# Patient Record
Sex: FEMALE | Race: WHITE | URBAN - METROPOLITAN AREA
[De-identification: names, ages, dates, MRNs, and addresses within clinical notes are randomized per-mention and may not be internally consistent; named-entity substitution may affect disease eponyms.]

---

## 2020-12-22 ENCOUNTER — HOSPITAL ENCOUNTER (OUTPATIENT)
Dept: PHYSICAL THERAPY | Age: 65
Setting detail: THERAPIES SERIES
Discharge: HOME | End: 2020-12-22
Payer: COMMERCIAL

## 2020-12-22 PROCEDURE — 97110 THERAPEUTIC EXERCISES: CPT | Performed by: PHYSICAL THERAPIST

## 2020-12-22 PROCEDURE — 97140 MANUAL THERAPY 1/> REGIONS: CPT | Performed by: PHYSICAL THERAPIST

## 2020-12-22 PROCEDURE — 97161 PT EVAL LOW COMPLEX 20 MIN: CPT | Performed by: PHYSICAL THERAPIST

## 2020-12-22 NOTE — PLAN OF CARE
[address] [facility],[address], [address] [place], [address]  Phone: [phone]   Fax: [phone]              Physical Therapy Certification    Dear Referring Practitioner: Allen,    We had the pleasure of evaluating the following patient for physical therapy services at     [address] [place] [address]. A summary of our findings can be found in the initial assessment below. This includes our plan of care. If you have any questions or concerns regarding these findings, please do not hesitate to contact me at the office phone number checked above. Thank you for the referral.       Physician Signature:_______________________________Date:__________________  By signing above (or electronic signature), therapists plan is approved by physician      Patient: Booker Higuera \"Meka\"   : 1955   MRN: [id number]  Referring Physician: Referring Practitioner: Allen      Evaluation Date: 2020      Medical Diagnosis Information:  Diagnosis: M51.36 (ICD-10-CM) - Other intervertebral disc degeneration, lumbar region Thigh pain-M79.659  Treatment Diagnosis: M51.36 (ICD-10-CM) - Other intervertebral disc degeneration, lumbar region; M54.40- low back pain with radiculopathy          Precautions/ Contra-indications: anxiety, numbness in left leg from buttock to knee in anterior thigh. C-SSRS Triggered by Intake questionnaire (Past 2 wk assessment):   [x] No, Questionnaire did not trigger screening.   [] Yes, Patient intake triggered further evaluation      [] C-SSRS Screening completed  [] PCP notified via Plan of Care  [] Emergency services notified   Latex Allergy:  [x] NO      [] YES  Preferred Language for Healthcare:   [x] English       [] other:    SUBJECTIVE: Patient stated complaint: This problem has escalated around the middle of Nov.  For the past 5 years, she had pain on lateral leg. She did stretch. This did help somewhat.   It would linger. She thought she had ITB and trochanteric bursitis. Then in the middle of November, if she lays on her back or her left side, she has numbness. She has to lay on her right with her left hip flexed. She does not have increased symptoms with standing or walking. Sitting and laying down is worse. When she sits she feels like she has to sit on her right side. She has seen her PCP. She saw her PCP when she had trouble sleeping. She ordered a MRI, which was done on 12/8/2020. Her PCP gave her the reading of the MRI. She then got her referral for PT. Relevant Medical History:anxiety, numbness in left leg from buttock to knee in anterior thigh. Cataracts surgery, macular pucker surgery in 2014, and foot surgery in 2016, h/o bulging disc in neck  Functional Scale:    CHRIS-14%, WOMAC-6.25%    Pain Scale: 0.5/10  Easing factors: Pain at best:  0/10. Treatment: walking, stretches, meloxicam- doesn't completely get rid of the pain, heat, ice. Heat feels better. Provocative factors: Pain at worst:  8/10. If she wakes up on her back, it really hurts. Type: [] Constant   [x] Intermittent  [] Radiating [] Localized [] other: tingling and numbness. Feels like if your foot goes to sleep     Numbness/Tingling: left hip to anterior thigh stopping above the knee    Functional Limitations/Impairments: [x] Sitting [] Standing [] Walking    [] Squatting/bending  [] Stairs           [] ADL's  [] Transfers [] Sports/Recreation [] Other:    Occupation/School: retired-     Living Status/Prior Level of Function: Independent with ADLs and IADLs, working out 3x/wk going to gym. Working out from home doing an Meritful. She was doing some twisting, and she didn't want to make it worse. She then stopped as she was nervous about hurting her back.   She is walking every day for about 1 hr.          Standing Exam Normal Abnormal N/A Comments   Toe walk   x      Heel Walk x buttock, and/or LE    [] peripheralization with extension movements    [] + SLR or + crossed SLR (symptoms less than 70 degrees)  [] Movement Control Approach   [] \"Stabilization subgroup\"    [] younger age (less than 36)     [] greater general flexibility (post-partum, SLR >90)    [] abberant movements, painful arc, or Nevin's sign with flex/ext ROM    [] positive prone instability test  [] Functional Optimization Approach   [] \"unspecified subgroup\"    [] lower disability scores     [] lower fear avoidance scores     [] longer duration of symptoms (chronic)    [] prior episodes of low back pain    [] older age                                  [x] Patient history, allergies, meds reviewed. Medical chart reviewed. See intake form. Review Of Systems (ROS):  [x]Performed Review of systems (Integumentary, CardioPulmonary, Neurological) by intake and observation. Intake form has been scanned into medical record. Patient has been instructed to contact their primary care physician regarding ROS issues if not already being addressed at this time.       Co-morbidities/Complexities (which will affect course of rehabilitation):   []None           Arthritic conditions   []Rheumatoid arthritis (M05.9)  []Osteoarthritis (M19.91)   Cardiovascular conditions   []Hypertension (I10)  []Hyperlipidemia (E78.5)  []Angina pectoris (I20)  []Atherosclerosis (I70)   Musculoskeletal conditions   []Disc pathology   []Congenital spine pathologies   []Prior surgical intervention  []Osteoporosis (M81.8)  []Osteopenia (M85.8)   Endocrine conditions   []Hypothyroid (E03.9)  []Hyperthyroid Gastrointestinal conditions   []Constipation (D82.34)   Metabolic conditions   []Morbid obesity (E66.01)  []Diabetes type 1(E10.65) or 2 (E11.65)   []Neuropathy (G60.9)     Pulmonary conditions   []Asthma (J45)  []Coughing   []COPD (J44.9)   Psychological Disorders  [x]Anxiety (F41.9)  []Depression (F32.9)   []Other:   []Other:          Barriers to/and or personal factors that will affect rehab potential:              []Age  []Sex              []Motivation/Lack of Motivation                        []Co-Morbidities              []Cognitive Function, education/learning barriers              []Environmental, home barriers              []profession/work barriers  []past PT/medical experience  []other:  Justification: Pt has had lateral leg pain for years. However, it has gotten worse and now turned into more numbness and tingling. She is now having difficulty sleeping. Her MRI states she has L5-S1 disc desiccation, concentric bulging disc without focal disc herniation or central spinal stenosis. Facet arthropathy. She reports that she thinks traction would be helpful, and it did feel better for her. Pt's insurance will start over with a large deductible. We did review this. She does have some tight ITB on her left with tight piriformis and glut med weakness as well as her radiating symptoms. Falls Risk Assessment (30 days):   [x] Falls Risk assessed and no intervention required.   [] Falls Risk assessed and Patient requires intervention due to being higher risk   TUG score (>12s at risk):     [] Falls education provided, including       ASSESSMENT:   Functional Impairments:     []Noted lumbar/proximal hip hypomobility   []Noted lumbosacral and/or generalized hypermobility   []Decreased Lumbosacral/hip/LE functional ROM   [x]Decreased core/proximal hip strength and neuromuscular control    [x]Decreased LE functional strength    [x]Abnormal reflexes/sensation/myotomal/dermatomal deficits  []Reduced balance/proprioceptive control    []other:      Functional Activity Limitations (from functional questionnaire and intake)   [x]Reduced ability to tolerate prolonged functional positions   []Reduced ability or difficulty with changes of positions or transfers between positions   []Reduced ability to maintain good posture and demonstrate good body mechanics with sitting, bending, and lifting   [x]Reduced ability to sleep   [] Reduced ability or tolerance with driving and/or computer work   []Reduced ability to perform lifting, reaching, carrying tasks   []Reduced ability to squat   []Reduced ability to forward bend   []Reduced ability to ambulate prolonged functional periods/distances/surfaces   []Reduced ability to ascend/descend stairs   []other:       Participation Restrictions   []Reduced participation in self care activities   []Reduced participation in home management activities   []Reduced participation in work activities   []Reduced participation in social activities. []Reduced participation in sport/recreation activities. Classification:   []Signs/symptoms consistent with Lumbar instability/stabilization subgroup. []Signs/symptoms consistent with Lumbar mobilization/manipulation subgroup, myotomes and dermatomes intact. Meets manipulation criteria. []Signs/symptoms consistent with Lumbar direction specific/centralization subgroup   []Signs/symptoms consistent with Lumbar traction subgroup     []Signs/symptoms consistent with lumbar facet dysfunction   []Signs/symptoms consistent with lumbar stenosis type dysfunction   []Signs/symptoms consistent with nerve root involvement including myotome & dermatome dysfunction   []Signs/symptoms consistent with post-surgical status including: decreased ROM, strength and function. []signs/symptoms consistent with pathology which may benefit from Dry needling     []other:  Pt is a 59 y/o female presenting with diagnosis of low back pain and left leg radiculopathy from the MD.  Clinically, the pt presents with decreased ROM, decreased strength, decreased function, and increased pain consistent with the MD diagnosis. The pt would benefit from skilled PT to return to PLOF. Pt has had lateral leg pain for years. However, it has gotten worse and now turned into more numbness and tingling.   She is now having difficulty sleeping. Her MRI states she has L5-S1 disc desiccation, concentric bulging disc without focal disc herniation or central spinal stenosis. Facet arthropathy. She reports that she thinks traction would be helpful, and it did feel better for her. Pt's insurance will start over with a large deductible. We did review this. She does have some tight ITB on her left with tight piriformis and glut med weakness as well as her radiating symptoms. We did review insurance benefits as well. They did not have questions regarding this. Prognosis/Rehab Potential:      []Excellent   [x]Good    []Fair   []Poor    Tolerance of evaluation/treatment:    []Excellent   [x]Good    []Fair   []Poor    PLAN: Begin PT focusing on: proximal hip mobilizations, LB mobs, LB core activation, proximal hip activation, and HEP    Frequency/Duration:  1-2 days per week for 6-8 Weeks:  Interventions:  [x]  Therapeutic exercise including: strength training, ROM, for LE, Glutes and core   [x]  NMR activation and proprioception for glutes , LE and Core   [x]  Manual therapy as indicated for Hip complex, LE and spine to include: Dry Needling/IASTM, STM, PROM, Gr I-IV mobilizations, manipulation. [x]  Modalities as needed that may include: thermal agents, E-stim, Biofeedback, US, iontophoresis as indicated  [x]  Patient education on joint protection, postural re-education, activity modification, progression of HEP. HEP instruction: (see scanned forms)      GOALS:   Patient stated goal: to be able to sleep better    [] Progressing: [] Met: [] Not Met: [] Adjusted    Therapist goals for Patient:   Short Term Goals: To be achieved in: 2 weeks  1. Independent in HEP and progression per patient tolerance, in order to prevent re-injury. [] Progressing: [] Met: [] Not Met: [] Adjusted   2. Patient will report pain at worst less than or equal to 6/10  [] Progressing: [] Met: [] Not Met: [] Adjusted    Long Term Goals:  To be achieved in: 8 weeks  1. Disability index score of 7% or less for the CHRIS to assist with reaching prior level of function. [] Progressing: [] Met: [] Not Met: [] Adjusted  2. Patient will demonstrate increased AROM to -FABERs to allow for proper joint functioning as indicated by patients functional deficits. [] Progressing: [] Met: [] Not Met: [] Adjusted  3. Patient will demonstrate an increase in Strength to in bilateral glut med to 4 to allow for proper functional mobility as indicated by patients Functional Deficits. [] Progressing: [] Met: [] Not Met: [] Adjusted  4. Patient will return to sleeping t/o the night without increased symptoms or restriction. [] Progressing: [] Met: [] Not Met: [] Adjusted  5. Pt will report pain at worst less than or equal to 4/10. [] Progressing: [] Met: [] Not Met: [] Adjusted   6. Pt will sit for 1 hr in different surfaces without increasing pain.     [] Progressing: [] Met: [] Not Met: [] Adjusted      Physical Therapy Evaluation Complexity Justification  [x] A history of present problem with:  [] no personal factors and/or comorbidities that impact the plan of care;  [x]1-2 personal factors and/or comorbidities that impact the plan of care  []3 personal factors and/or comorbidities that impact the plan of care  [x] An examination of body systems using standardized tests and measures addressing any of the following: body structures and functions (impairments), activity limitations, and/or participation restrictions;:  [] a total of 1-2 or more elements   [] a total of 3 or more elements   [x] a total of 4 or more elements   [x] A clinical presentation with:  [x] stable and/or uncomplicated characteristics   [] evolving clinical presentation with changing characteristics  [] unstable and unpredictable characteristics;   [x] Clinical decision making of [x] low, [] moderate, [] high complexity using standardized patient assessment instrument and/or measurable assessment of functional outcome.     [x] EVAL (LOW) 41847 (typically 20 minutes face-to-face)  [] EVAL (MOD) 72776 (typically 30 minutes face-to-face)  [] EVAL (HIGH) 57302 (typically 45 minutes face-to-face)  [] Sahara Holley    Electronically signed by:  Violette Thompson, PT, DPT [id number]

## 2020-12-22 NOTE — FLOWSHEET NOTE
[facility], [address] [place], [address]  Phone: [phone]   Fax: [phone]      Physical Therapy Treatment Note/ Progress Report:           Date:  2020    Patient Name:  Leena Kilpatrick  \"Meka\"  :  1955  MRN: [id number]  Restrictions/Precautions:    Medical/Treatment Diagnosis Information:  · Diagnosis: M51.36 (ICD-10-CM) - Other intervertebral disc degeneration, lumbar region; thigh pain-M79.659. · Treatment Diagnosis: M51.36 (ICD-10-CM) - Other intervertebral disc degeneration, lumbar region; M54.40- low back pain with radiculopathy. Onset-1060  Insurance/Certification information:  PT Insurance Information: [employer]  Physician Information:  Referring Practitioner: Alter  Has the plan of care been signed (Y/N):        []  Yes  [x]  No     Date of Patient follow up with Physician: TBD      Is this a Progress Report:     []  Yes  [x]  No        If Yes:  Date Range for reporting period:  Beginning   Ending    Progress report will be due (10 Rx or 30 days whichever is less): visit 10 or 9112       Recertification will be due (POC Duration  / 90 days whichever is less): see above         Visit # Insurance Allowable Auth Required   1 75.   Auth after 15 visits [x]  Yes []  No        Functional Scale: CHRIS-14%, WOMAC-6.25%   Date assessed: 2020    Latex Allergy:  [x] NO      [] YES  Preferred Language for Healthcare:   [x] English       [] other:      Pain level:  0.5/10     SUBJECTIVE:  See eval    OBJECTIVE: See eval      Standing Exam Normal Abnormal N/A Comments   Toe walk         Heel Walk       Side bending       Pelvic Height       Fwd Bend- (aberrant juttering or innominate mvmt)       Extension       Trendelenburg       Kemps/Quadrant       Stork       SLS/SLS w rotation                     Seated Exam Normal Abnormal N/A Comments   Pelvic Height       Seated Rotation       Seated flexion       B hip IR                     Supine Exam Normal Abnormal N/A Comments   Hip flexion       Abduction       ER       IR       STANLEY/Navarro       Hip scour       SLR       Crossed SLR       Supine to sit       SI distraction/compression       Hip thrust                     Prone Exam Normal Abnormal N/A Comments   Prone knee bend       Prone hip IR       B Achilles reflex/Pheasant       PA/Spring       Prone Instability test       Sacral Spring/thrust                       ROM LEFT RIGHT Comments   Lumbar Flex      Lumbar Ext      Side Bend      Rotation                    ROM LEFT RIGHT Comments   Hip Flexion      Hip Abd      Hip ER      Hip IR      Hip Extension      Knee Ext      Knee Flex      Hamstring Flex      Piriformis                    Strength LEFT RIGHT Comments   Multifidus      Transverse Ab      Hip Flexors      Hip Abductors      Hip Extensors                     Myotomes Normal Abnormal Comments   Hip flexion (L1-L2)      Knee extension (L2-L4)      Dorsiflexion (L4-L5)      Great Toe Ext (L5)      Ankle Eversion (S1-S2)      Ankle PF(S1-S2)          Dermatomes Normal Abnormal Comments   inguinal area (L1)       anterior mid-thigh (L2)      distal ant thigh/med knee (L3)      medial lower leg and foot (L4)      lateral lower leg and foot (L5)      posterior calf (S1)      medial calcaneus (S2)          Neural dynamic tension testing Normal Abnormal Comments   Slump Test  - Degree of knee flexion:       SLR       0-30      30-70      Femoral nerve (L2-4)        Reflexes Normal Abnormal Comments   S1-2 Seated achilles      S1-2 Prone knee bend      L3-4 Patellar tendon      C5-6 Biceps      C6 Brachioradialis      C7-8 Triceps      Clonus      Babinski      Saenz's            Positional Tolerance     Standing repeated flex    Standing repeated ext    Prone laying    Prone prop    Prone laying with pillow    Supine manual B LE distraction    Supine with wedge under LE with physician  [] Other    Prognosis for POC: [x] Good [] Fair  [] Poor      Patient requires continued skilled intervention: [x] Yes  [] No    Treatment/Activity Tolerance:  [x] Patient able to complete treatment  [] Patient limited by fatigue  [] Patient limited by pain     [] Patient limited by other medical complications  [] Other: Pt is a 66 y/o female presenting with diagnosis of low back pain and left leg radiculopathy from the MD.  Clinically, the pt presents with decreased ROM, decreased strength, decreased function, and increased pain consistent with the MD diagnosis.  The pt would benefit from skilled PT to return to OF.  Pt has had lateral leg pain for years. However, it has gotten worse and now turned into more numbness and tingling. She is now having difficulty sleeping. Her MRI states she has L5-S1 disc desiccation, concentric bulging disc without focal disc herniation or central spinal stenosis. Facet arthropathy. She reports that she thinks traction would be helpful, and it did feel better for her. Pt's insurance will start over with a large deductible. We did review this. She does have some tight ITB on her left with tight piriformis and glut med weakness as well as her radiating symptoms.  We did review insurance benefits as well.  They did not have questions regarding this.      PLAN: If pt doesn't return, this note can be considered a D/C note.   See eval  [] Continue per plan of care [] Alter current plan (see comments above)  [x] Plan of care initiated [] Hold pending MD visit [] Discharge  Electronically signed by: Iliana Martinez DPT [id number]

## 2020-12-29 ENCOUNTER — HOSPITAL ENCOUNTER (OUTPATIENT)
Dept: PHYSICAL THERAPY | Age: 65
Setting detail: THERAPIES SERIES
Discharge: HOME | End: 2020-12-29
Payer: COMMERCIAL

## 2020-12-29 PROCEDURE — 97110 THERAPEUTIC EXERCISES: CPT | Performed by: PHYSICAL THERAPIST

## 2020-12-29 PROCEDURE — 97140 MANUAL THERAPY 1/> REGIONS: CPT | Performed by: PHYSICAL THERAPIST

## 2020-12-29 NOTE — FLOWSHEET NOTE
[address] [facility] [address] [facility], [address] [place], [address]  Phone: [phone]   Fax: [phone]      Physical Therapy Treatment Note/ Progress Report:           Date:  2020    Patient Name:  Thomas Fish  \"Meka\"  :  1955  MRN: [id number]  Restrictions/Precautions:    Medical/Treatment Diagnosis Information:  · Diagnosis: M51.36 (ICD-10-CM) - Other intervertebral disc degeneration, lumbar region; thigh pain-M79.659. · Treatment Diagnosis: M51.36 (ICD-10-CM) - Other intervertebral disc degeneration, lumbar region; M54.40- low back pain with radiculopathy. Onset-135  Insurance/Certification information:  PT Insurance Information: [employer]  Physician Information:  Referring Practitioner: Alter  Has the plan of care been signed (Y/N):        []  Yes  [x]  No     Date of Patient follow up with Physician: TBD      Is this a Progress Report:     []  Yes  [x]  No        If Yes:  Date Range for reporting period:  Beginning   Ending    Progress report will be due (10 Rx or 30 days whichever is less): visit 10 or 625       Recertification will be due (POC Duration  / 90 days whichever is less): see above         Visit # Insurance Allowable Auth Required   2 75. Auth after 15 visits [x]  Yes []  No        Functional Scale: CHRIS-14%, WOMAC-6.25%   Date assessed: 2020    Latex Allergy:  [x] NO      [] YES  Preferred Language for Healthcare:   [x] English       [] other:      Pain level:  0.5/10     SUBJECTIVE:  The patient noted that she can already tell there is some improvement. Yesterday she 4 hours of no sxs.      OBJECTIVE:       Standing Exam Normal Abnormal N/A Comments   Toe walk         Heel Walk       Side bending       Pelvic Height       Fwd Bend- (aberrant juttering or innominate mvmt)       Extension       Trendelenburg       Kemps/Quadrant       Stork       SLS/SLS w rotation Seated Exam Normal Abnormal N/A Comments   Pelvic Height       Seated Rotation       Seated flexion       B hip IR                     Supine Exam Normal Abnormal N/A Comments   Hip flexion       Abduction       ER       IR       STANLEY/Navarro       Hip scour       SLR       Crossed SLR       Supine to sit       SI distraction/compression       Hip thrust                     Prone Exam Normal Abnormal N/A Comments   Prone knee bend       Prone hip IR       B Achilles reflex/Pheasant       PA/Spring       Prone Instability test       Sacral Spring/thrust                       ROM LEFT RIGHT Comments   Lumbar Flex      Lumbar Ext      Side Bend      Rotation                    ROM LEFT RIGHT Comments   Hip Flexion      Hip Abd      Hip ER      Hip IR      Hip Extension      Knee Ext      Knee Flex      Hamstring Flex      Piriformis                    Strength LEFT RIGHT Comments   Multifidus      Transverse Ab      Hip Flexors      Hip Abductors      Hip Extensors                     Myotomes Normal Abnormal Comments   Hip flexion (L1-L2)      Knee extension (L2-L4)      Dorsiflexion (L4-L5)      Great Toe Ext (L5)      Ankle Eversion (S1-S2)      Ankle PF(S1-S2)          Dermatomes Normal Abnormal Comments   inguinal area (L1)       anterior mid-thigh (L2)      distal ant thigh/med knee (L3)      medial lower leg and foot (L4)      lateral lower leg and foot (L5)      posterior calf (S1)      medial calcaneus (S2)          Neural dynamic tension testing Normal Abnormal Comments   Slump Test  - Degree of knee flexion:       SLR       0-30      30-70      Femoral nerve (L2-4)        Reflexes Normal Abnormal Comments   S1-2 Seated achilles      S1-2 Prone knee bend      L3-4 Patellar tendon      C5-6 Biceps      C6 Brachioradialis      C7-8 Triceps      Clonus      Babinski      Saenz's            Positional Tolerance     Standing repeated flex    Standing repeated ext    Prone laying    Prone prop    Prone laying improving balance, coordination, kinesthetic sense, posture, motor skill, proprioception  to assist with LE, proximal hip, and core control in self care, mobility, lifting, ambulation and eccentric single leg control. NMR and Therapeutic Activities:    [x] (32075 or 27454) Provided verbal/tactile cueing for activities related to improving balance, coordination, kinesthetic sense, posture, motor skill, proprioception and motor activation to allow for proper function of core, proximal hip and LE with self care and ADLs  [x] (91138) Gait Re-education- Provided training and instruction to the patient for proper LE, core and proximal hip recruitment and positioning and eccentric body weight control with ambulation re-education including up and down stairs     Home Exercise Program:    [x] (29203) Reviewed/Progressed HEP activities related to strengthening, flexibility, endurance, ROM of core, proximal hip and LE for functional self-care, mobility, lifting and ambulation/stair navigation   [] (47082)Reviewed/Progressed HEP activities related to improving balance, coordination, kinesthetic sense, posture, motor skill, proprioception of core, proximal hip and LE for self care, mobility, lifting, and ambulation/stair navigation      Manual Treatments:  PROM / STM / Oscillations-Mobs:  G-I, II, III, IV (PA's, Inf., Post.)  [x] (16007) Provided manual therapy to mobilize LE, proximal hip and/or LS spine soft tissue/joints for the purpose of modulating pain, promoting relaxation,  increasing ROM, reducing/eliminating soft tissue swelling/inflammation/restriction, improving soft tissue extensibility and allowing for proper ROM for normal function with self care, mobility, lifting and ambulation.      Modalities:   pre     Charges:  Timed Code Treatment Minutes: 39'   Total Treatment Minutes: 95'     [] EVAL (LOW) 98965   [] EVAL (MOD) 53660   [] EVAL (HIGH) 86732   [] RE-EVAL   [x] IH(73608) x 1    [] IONTO  [] NMR (83638) x     [] VASO  [x] Manual (75230) x 1     [] Other:  [] TA x      [] Mech Traction (54822)  [] ES(attended) (61681)      [] ES (un) (86958):       GOALS:   Patient stated goal: to be able to sleep better    [] Progressing: [] Met: [] Not Met: [] Adjusted    Therapist goals for Patient:   Short Term Goals: To be achieved in: 2 weeks  1. Independent in HEP and progression per patient tolerance, in order to prevent re-injury. [] Progressing: [] Met: [] Not Met: [] Adjusted   2. Patient will report pain at worst less than or equal to 6/10  [] Progressing: [] Met: [] Not Met: [] Adjusted    Long Term Goals: To be achieved in: 8 weeks  1. Disability index score of 7% or less for the CHRIS to assist with reaching prior level of function. [] Progressing: [] Met: [] Not Met: [] Adjusted  2. Patient will demonstrate increased AROM to -FABERs to allow for proper joint functioning as indicated by patients functional deficits. [] Progressing: [] Met: [] Not Met: [] Adjusted  3. Patient will demonstrate an increase in Strength to in bilateral glut med to 4 to allow for proper functional mobility as indicated by patients Functional Deficits. [] Progressing: [] Met: [] Not Met: [] Adjusted  4. Patient will return to sleeping t/o the night without increased symptoms or restriction. [] Progressing: [] Met: [] Not Met: [] Adjusted  5. Pt will report pain at worst less than or equal to 4/10. [] Progressing: [] Met: [] Not Met: [] Adjusted   6. Pt will sit for 1 hr in different surfaces without increasing pain. [] Progressing: [] Met: [] Not Met: [] Adjusted    Overall Progression Towards Functional goals/ Treatment Progress Update:  [] Patient is progressing as expected towards functional goals listed. [] Progression is slowed due to complexities/Impairments listed. [] Progression has been slowed due to co-morbidities.   [x] Plan just implemented, too soon to assess goals progression <30days   [] Goals require adjustment due to lack of progress  [] Patient is not progressing as expected and requires additional follow up with physician  [] Other    Prognosis for POC: [x] Good [] Fair  [] Poor      Patient requires continued skilled intervention: [x] Yes  [] No    Treatment/Activity Tolerance:  [x] Patient able to complete treatment  [] Patient limited by fatigue  [] Patient limited by pain     [] Patient limited by other medical complications  [] Other:       PLAN: If pt doesn't return, this note can be considered a D/C note.     [] Continue per plan of care [] Alter current plan (see comments above)  [x] Plan of care initiated [] Hold pending MD visit [] Discharge    Electronically signed by: Alfred Hadley PT, MPT

## 2021-01-05 ENCOUNTER — HOSPITAL ENCOUNTER (OUTPATIENT)
Dept: PHYSICAL THERAPY | Age: 66
Setting detail: THERAPIES SERIES
Discharge: HOME | End: 2021-01-05
Payer: COMMERCIAL

## 2021-01-05 PROCEDURE — 97140 MANUAL THERAPY 1/> REGIONS: CPT | Performed by: PHYSICAL THERAPIST

## 2021-01-05 PROCEDURE — 97110 THERAPEUTIC EXERCISES: CPT | Performed by: PHYSICAL THERAPIST

## 2021-01-05 NOTE — FLOWSHEET NOTE
[address] [facility],[address], [address] [facility], [address] [facility]  Phone: [phone]   Fax: [phone]      Physical Therapy Treatment Note/ Progress Report:           Date:  2021    Patient Name:  Emiliano Rueda  \"Meka\"  :  1955  MRN: [id number]  Restrictions/Precautions:    Medical/Treatment Diagnosis Information:  · Diagnosis: M51.36 (ICD-10-CM) - Other intervertebral disc degeneration, lumbar region; thigh pain-M79.659. · Treatment Diagnosis: M51.36 (ICD-10-CM) - Other intervertebral disc degeneration, lumbar region; M54.40- low back pain with radiculopathy. Onset-4189  Insurance/Certification information:  PT Insurance Information: [employer]  Physician Information:  Referring Practitioner: Alter  Has the plan of care been signed (Y/N):        []  Yes  [x]  No     Date of Patient follow up with Physician: TBD      Is this a Progress Report:     []  Yes  [x]  No        If Yes:  Date Range for reporting period:  Beginning   Ending    Progress report will be due (10 Rx or 30 days whichever is less): visit 10 or 6423       Recertification will be due (POC Duration  / 90 days whichever is less): see above         Visit # Insurance Allowable Auth Required   2+1 in  75. Auth after 15 visits [x]  Yes []  No        Functional Scale: CHRIS-14%, WOMAC-6.25%   Date assessed: 2020    Latex Allergy:  [x] NO      [] YES  Preferred Language for Healthcare:   [x] English       [] other:      Pain level:  4/10     SUBJECTIVE:  She has tingling on the lateral thigh that is pretty constant (over ITB). She has some back pain, which is constant. She is wondering if her back is sore from the HEP. Sleeping has been better. She thinks laying on her stomach has been helping. She feels she has been able to get to sleep better.       OBJECTIVE:       Standing Exam Normal Abnormal N/A Comments   Toe walk         Heel Walk Side bending       Pelvic Height       Fwd Bend- (aberrant juttering or innominate mvmt)       Extension       Trendelenburg       Kemps/Quadrant       Stork       SLS/SLS w rotation                     Seated Exam Normal Abnormal N/A Comments   Pelvic Height       Seated Rotation       Seated flexion       B hip IR                     Supine Exam Normal Abnormal N/A Comments   Hip flexion       Abduction       ER       IR       STANLEY/Navarro       Hip scour       SLR       Crossed SLR       Supine to sit       SI distraction/compression       Hip thrust                     Prone Exam Normal Abnormal N/A Comments   Prone knee bend       Prone hip IR       B Achilles reflex/Pheasant       PA/Spring       Prone Instability test       Sacral Spring/thrust                       ROM LEFT RIGHT Comments   Lumbar Flex      Lumbar Ext      Side Bend      Rotation                    ROM LEFT RIGHT Comments   Hip Flexion      Hip Abd      Hip ER      Hip IR      Hip Extension      Knee Ext      Knee Flex      Hamstring Flex      Piriformis                    Strength LEFT RIGHT Comments   Multifidus      Transverse Ab      Hip Flexors      Hip Abductors      Hip Extensors                     Myotomes Normal Abnormal Comments   Hip flexion (L1-L2)      Knee extension (L2-L4)      Dorsiflexion (L4-L5)      Great Toe Ext (L5)      Ankle Eversion (S1-S2)      Ankle PF(S1-S2)          Dermatomes Normal Abnormal Comments   inguinal area (L1)       anterior mid-thigh (L2)      distal ant thigh/med knee (L3)      medial lower leg and foot (L4)      lateral lower leg and foot (L5)      posterior calf (S1)      medial calcaneus (S2)          Neural dynamic tension testing Normal Abnormal Comments   Slump Test  - Degree of knee flexion:       SLR       0-30      30-70      Femoral nerve (L2-4)        Reflexes Normal Abnormal Comments   S1-2 Seated achilles      S1-2 Prone knee bend      L3-4 Patellar tendon      C5-6 Biceps      C6 Brachioradialis      C7-8 Triceps      Clonus      Babinski      Saenz's            Positional Tolerance     Standing repeated flex    Standing repeated ext    Prone laying    Prone prop    Prone laying with pillow    Supine manual B LE distraction    Supine with wedge under LE              RESTRICTIONS/PRECAUTIONS: MRI results-  L5-S1 disc desiccation, concentric bulging disc without focal disc herniation or central spinal stenosis. Facet arthropathy. anxiety, numbness in left leg from buttock to knee in anterior thigh. Cataracts surgery, macular pucker surgery in , and foot surgery in 2016, h/o bulging disc in neck      Exercises/Interventions:     Exercise/Equipment Resistance/Repetitions Other comments   Hamstring stretch     Piriformis stretch 5x:30 left   Figure 4ITB stretch 5x:30 left        Knee to chest     Seated on heels low back stretch     Pelvic tilts     TrA contraction 10x:10 x 2  With kegel and biofeedback   TrA contraction with alt marches     Prone press ups 10x:10 On elbows   BS     B hip abd with TB     Standing hip ABD 10x bilaterally Could feel radiating symptoms after 5 on right        clams 3x10; 1#  bilaterally   Low lumbar rotation     bridges     Cat/camel      bird/dog     Opposite UE to LE isometric core     TrA/mutlifidi walk outs     TrA/multifidi push outs                         SB pikes     SB knee tucks     SB roll outs     planks     Manual Traction over ball-8'     STM- piriformis- hyper volt 8'       Access Code: [email or web address]   URL: [email or web address]   Date: 2020   Prepared by: Lc Dias Cessna     Exercises  Self Traction Sitting - 2-3x daily - 7x weekly  Supine Piriformis Stretch - 5 reps - 30 hold - 2x daily - 7x weekly  Supine ITB Stretch with Strap - 5 reps - 30 hold - 2x daily - 7x weekly  Clamshell - 10 reps - 3 sets - 1-2x daily - 7x weekly  Supine Transversus Abdominis Bracing - Hands on Stomach - 10 reps - 10 hold - 2x daily - 7x weekly    Therapeutic Exercise and NMR EXR  [x] (75019) Provided verbal/tactile cueing for activities related to strengthening, flexibility, endurance, ROM for improvements in LE, proximal hip, and core control with self care, mobility, lifting, ambulation. [x] (25993) Provided verbal/tactile cueing for activities related to improving balance, coordination, kinesthetic sense, posture, motor skill, proprioception  to assist with LE, proximal hip, and core control in self care, mobility, lifting, ambulation and eccentric single leg control.      NMR and Therapeutic Activities:    [x] (88200 or 02354) Provided verbal/tactile cueing for activities related to improving balance, coordination, kinesthetic sense, posture, motor skill, proprioception and motor activation to allow for proper function of core, proximal hip and LE with self care and ADLs  [x] (17546) Gait Re-education- Provided training and instruction to the patient for proper LE, core and proximal hip recruitment and positioning and eccentric body weight control with ambulation re-education including up and down stairs     Home Exercise Program:    [x] (39710) Reviewed/Progressed HEP activities related to strengthening, flexibility, endurance, ROM of core, proximal hip and LE for functional self-care, mobility, lifting and ambulation/stair navigation   [] (24941)Reviewed/Progressed HEP activities related to improving balance, coordination, kinesthetic sense, posture, motor skill, proprioception of core, proximal hip and LE for self care, mobility, lifting, and ambulation/stair navigation      Manual Treatments:  PROM / STM / Oscillations-Mobs:  G-I, II, III, IV (PA's, Inf., Post.)  [x] (27690) Provided manual therapy to mobilize LE, proximal hip and/or LS spine soft tissue/joints for the purpose of modulating pain, promoting relaxation,  increasing ROM, reducing/eliminating soft tissue swelling/inflammation/restriction, improving soft tissue extensibility and allowing for proper ROM for normal function with self care, mobility, lifting and ambulation. Modalities:      Charges:   Timed Code Treatment Minutes: 30'   Total Treatment Minutes: 54'     [] EVAL (LOW) [id number]   [] EVAL (MOD) 39897   [] EVAL (HIGH) 81351   [] RE-EVAL   [x] MC(48617) x 1    [] IONTO  [] NMR (88881) x     [] VASO  [x] Manual (79371) x 1     [] Other:  [] TA x      [] Mech Traction (64477)  [] ES(attended) (88754)      [] ES (un) (84033):       GOALS:   Patient stated goal: to be able to sleep better    [] Progressing: [] Met: [] Not Met: [] Adjusted    Therapist goals for Patient:   Short Term Goals: To be achieved in: 2 weeks  1. Independent in HEP and progression per patient tolerance, in order to prevent re-injury. [] Progressing: [] Met: [] Not Met: [] Adjusted   2. Patient will report pain at worst less than or equal to 6/10  [] Progressing: [] Met: [] Not Met: [] Adjusted    Long Term Goals: To be achieved in: 8 weeks  1. Disability index score of 7% or less for the CHRIS to assist with reaching prior level of function. [] Progressing: [] Met: [] Not Met: [] Adjusted  2. Patient will demonstrate increased AROM to -FABERs to allow for proper joint functioning as indicated by patients functional deficits. [] Progressing: [] Met: [] Not Met: [] Adjusted  3. Patient will demonstrate an increase in Strength to in bilateral glut med to 4 to allow for proper functional mobility as indicated by patients Functional Deficits. [] Progressing: [] Met: [] Not Met: [] Adjusted  4. Patient will return to sleeping t/o the night without increased symptoms or restriction. [] Progressing: [] Met: [] Not Met: [] Adjusted  5. Pt will report pain at worst less than or equal to 4/10. [] Progressing: [] Met: [] Not Met: [] Adjusted   6. Pt will sit for 1 hr in different surfaces without increasing pain.     [] Progressing: [] Met: [] Not Met: [] Adjusted    Overall Progression Towards Functional goals/ Treatment Progress Update:  [] Patient is progressing as expected towards functional goals listed. [] Progression is slowed due to complexities/Impairments listed. [] Progression has been slowed due to co-morbidities. [x] Plan just implemented, too soon to assess goals progression <30days   [] Goals require adjustment due to lack of progress  [] Patient is not progressing as expected and requires additional follow up with physician  [] Other    Prognosis for POC: [x] Good [] Fair  [] Poor      Patient requires continued skilled intervention: [x] Yes  [] No    Treatment/Activity Tolerance:  [x] Patient able to complete treatment  [] Patient limited by fatigue  [] Patient limited by pain     [] Patient limited by other medical complications  [] Other:  Pt bayron tx fair/well. She did have more radicular symptoms with standing hip ABD performed with contralateral leg. We did try to do some side flex stretch, and neither eased her symptoms. Her symptoms did improve with extension. We discussed different ways to try to do this at home, particularly when she has radiating symptoms. She is understanding. We verbally reviewed her HEP. Pt would continue to benefit from skilled PT to improve strength, ROM, and function. PLAN: If pt doesn't return, this note can be considered a D/C note. Continue to work on core strength and extension based exercises.     [] Continue per plan of care [] Alter current plan (see comments above)  [x] Plan of care initiated [] Hold pending MD visit [] Discharge    Electronically signed by: Esteban Mckenna DPT [id number]

## 2021-01-12 ENCOUNTER — HOSPITAL ENCOUNTER (OUTPATIENT)
Dept: PHYSICAL THERAPY | Age: 66
Setting detail: THERAPIES SERIES
Discharge: HOME | End: 2021-01-12
Payer: COMMERCIAL

## 2021-01-12 PROCEDURE — 97110 THERAPEUTIC EXERCISES: CPT | Performed by: PHYSICAL THERAPIST

## 2021-01-12 PROCEDURE — 97140 MANUAL THERAPY 1/> REGIONS: CPT | Performed by: PHYSICAL THERAPIST

## 2021-01-19 ENCOUNTER — HOSPITAL ENCOUNTER (OUTPATIENT)
Dept: PHYSICAL THERAPY | Age: 66
Setting detail: THERAPIES SERIES
Discharge: HOME | End: 2021-01-19
Payer: COMMERCIAL

## 2021-01-19 PROCEDURE — 97112 NEUROMUSCULAR REEDUCATION: CPT | Performed by: PHYSICAL THERAPIST

## 2021-01-19 PROCEDURE — 97140 MANUAL THERAPY 1/> REGIONS: CPT | Performed by: PHYSICAL THERAPIST

## 2021-01-19 PROCEDURE — 97110 THERAPEUTIC EXERCISES: CPT | Performed by: PHYSICAL THERAPIST

## 2021-01-19 NOTE — PLAN OF CARE
[address], [address] [place], [address]  Phone: [phone]   Fax: [phone]      Physical Therapy Treatment Note/ Progress Report:      Physical Therapy Re-Certification Plan of Care    Dear Dr. Joseph Caraballo,    We had the pleasure of treating the following patient for physical therapy services at [address] [place] [address]. A summary of our findings can be found in the updated assessment below. This includes our plan of care. If you have any questions or concerns regarding these findings, please do not hesitate to contact me at the office phone number checked above. Thank you for the referral.     Physician Signature:________________________________Date:__________________  By signing above (or electronic signature), therapists plan is approved by physician    Date Range Of Visits: 12/22/20-1/19/2021  Total Visits to Date: 5  Overall Response to Treatment:   [x] Patient is responding well to treatment and improvement is noted with regards to goals   [] Patient should continue to improve in reasonable time if they continue HEP   [] Patient has plateaued and is no longer responding to skilled PT intervention    [] Patient is getting worse and would benefit from return to referring MD   [] Patient unable to adhere to initial POC   [] Other: Pt bayron tx well. She has been able to manage the radiating pain by doing her prone extension exercises. I did give her an updated HEP for home. She is concerned about her insurance coverage, and I did give her a copy of this quote from last year so she can call them and check her benefits. She also plans on reviewing her MRI with an orthopaedic MD.  She can f/u with me pending this. Pt would continue to benefit from skilled PT to improve strength, ROM, and function. Recommend continuing tx 1/wk to every other week x 4-6 weeks.                Date:  1/19/2021    Patient Name:  Andrey David Mary Mckinney  \"Meka\"  :  1955  MRN: [id number]  Restrictions/Precautions:    Medical/Treatment Diagnosis Information:  · Diagnosis: M51.36 (ICD-10-CM) - Other intervertebral disc degeneration, lumbar region; thigh pain-M79.659. · Treatment Diagnosis: M51.36 (ICD-10-CM) - Other intervertebral disc degeneration, lumbar region; M54.40- low back pain with radiculopathy. Onset-288  Insurance/Certification information:  PT Insurance Information: [employer]  Physician Information:  Referring Practitioner: Alter  Has the plan of care been signed (Y/N):        []  Yes  [x]  No     Date of Patient follow up with Physician: TBD      Is this a Progress Report:     [x]  Yes  []  No        If Yes:  Date Range for reporting period:  Beginning   Ending     Progress report will be due (10 Rx or 30 days whichever is less): visit 15 or 44      Recertification will be due (POC Duration  / 90 days whichever is less): see above         Visit # Insurance Allowable Auth Required   2+3 in  75. Auth after 15 visits [x]  Yes []  No        Functional Scale: CHRIS-10%   Date assessed: 21    Latex Allergy:  [x] NO      [] YES  Preferred Language for Healthcare:   [x] English       [] other:      Pain level:  2/10     SUBJECTIVE:  The prone extension really helps. She has been able to do her cat/cow exercise. The pain in her leg comes and goes. She feels it is less frequent. The pain in her leg does not wake her up. She does have a hard time going to sleep, but she does not feel it is completely from the hip/back. She does have to sleep in a particular position for her hip/back. Pt can sit for about 30' without changing her symptoms. She has been researching to think about who she is going to see to review her MRI.        OBJECTIVE:        Standing Exam Normal Abnormal N/A Comments   Toe walk         Heel Walk       Side bending       Pelvic Height       Fwd Bend- (aberrant juttering or innominate mvmt)       Extension       Trendelenburg       Kemps/Quadrant       Stork       SLS/SLS w rotation                     Seated Exam Normal Abnormal N/A Comments   Pelvic Height       Seated Rotation       Seated flexion       B hip IR                     Supine Exam Normal Abnormal N/A Comments   Hip flexion       Abduction       ER       IR       STANLEY/Navarro  X pain down lateral hip  Normal motion   Hip scour x      SLR       Crossed SLR       Supine to sit       SI distraction/compression       Hip thrust                     Prone Exam Normal Abnormal N/A Comments   Prone knee bend       Prone hip IR       B Achilles reflex/Pheasant       PA/Spring       Prone Instability test       Sacral Spring/thrust                       ROM LEFT RIGHT Comments   Lumbar Flex      Lumbar Ext      Side Bend      Rotation                    ROM LEFT RIGHT Comments   Hip Flexion      Hip Abd      Hip ER      Hip IR      Hip Extension      Knee Ext      Knee Flex      Hamstring Flex      Piriformis                    Strength LEFT RIGHT Comments   Multifidus      Transverse Ab      Hip Flexors      Hip Abductors 3+ 3+    Hip Extensors                     Myotomes Normal Abnormal Comments   Hip flexion (L1-L2)      Knee extension (L2-L4)      Dorsiflexion (L4-L5)      Great Toe Ext (L5)      Ankle Eversion (S1-S2)      Ankle PF(S1-S2)          Dermatomes Normal Abnormal Comments   inguinal area (L1)       anterior mid-thigh (L2)      distal ant thigh/med knee (L3)      medial lower leg and foot (L4)      lateral lower leg and foot (L5)      posterior calf (S1)      medial calcaneus (S2)          Neural dynamic tension testing Normal Abnormal Comments   Slump Test  - Degree of knee flexion:       SLR       0-30      30-70      Femoral nerve (L2-4)        Reflexes Normal Abnormal Comments   S1-2 Seated achilles      S1-2 Prone knee bend      L3-4 Patellar tendon      C5-6 Biceps      C6 Brachioradialis C7-8 Triceps      Clonus      Babinski      Saenz's            Positional Tolerance     Standing repeated flex    Standing repeated ext    Prone laying    Prone prop    Prone laying with pillow    Supine manual B LE distraction    Supine with wedge under LE              RESTRICTIONS/PRECAUTIONS: MRI results-  L5-S1 disc desiccation, concentric bulging disc without focal disc herniation or central spinal stenosis. Facet arthropathy. anxiety, numbness in left leg from buttock to knee in anterior thigh. Cataracts surgery, macular pucker surgery in 2014, and foot surgery in 2016, h/o bulging disc in neck      Exercises/Interventions:     Exercise/Equipment Resistance/Repetitions Other comments   Hamstring stretch     Piriformis stretch Figure 4ITB stretch 5x:30 left             LTR 10x:05    Knee to chest 10x:05 SKTC   Seated on heels low back stretch     Pelvic tilts     TrA contraction 10x:10  With kegel and biofeedback   TrA contraction with alt marches     Prone press ups 10x:10 On elbows   BS     B hip abd with TB     Standing hip ABD      clams 3x10; 1#  bilaterally   Low lumbar rotation     bridges 3x10 with red band for ABD firing    Cat/camel      bird/dog     Opposite UE to LE isometric core     TrA/mutlifidi walk outs     TrA/multifidi push outs                         SB pikes     SB knee tucks     SB roll outs     planks     Manual Traction over ball-10'           Access Code: [email or web address]   URL: [email or web address]   Date: 01/19/2021   Prepared by: Mallorie Busch Cessimer     Exercises  Self Traction Sitting - 2-3x daily - 7x weekly  Supine Figure 4 Piriformis Stretch - 5 sets - 30 hold - 2x daily - 7x weekly  Clamshell - 10 reps - 3 sets - 1-2x daily - 7x weekly  Supine Lower Trunk Rotation - 10 reps - 5-10 hold - 2x daily - 7x weekly  Supine Transversus Abdominis Bracing - Hands on Stomach - 10 reps - 10 hold - 2x daily - 7x weekly  Supine Single Knee to Chest Stretch - 10 reps - 5-10 hold - 1-2x daily - 7x weekly  Alternating Single Leg Balance - Foot Behind - 3 sets - 15-30 hold                            - 1x daily - 3-7x weekly  Supine Bridge with Resistance Band - 10 reps - 3 sets - 1x daily - 3-7x weekly  Cat-Camel - 10 reps - 5-10 hold - 1x daily - 3-7x weekly  Prone Press Up on Elbows - 10 reps - 10 hold - 3-4x daily - 7x weekly    Therapeutic Exercise and NMR EXR  [x] (05035) Provided verbal/tactile cueing for activities related to strengthening, flexibility, endurance, ROM for improvements in LE, proximal hip, and core control with self care, mobility, lifting, ambulation. [x] (96361) Provided verbal/tactile cueing for activities related to improving balance, coordination, kinesthetic sense, posture, motor skill, proprioception  to assist with LE, proximal hip, and core control in self care, mobility, lifting, ambulation and eccentric single leg control.      NMR and Therapeutic Activities:    [x] (38826 or 64288) Provided verbal/tactile cueing for activities related to improving balance, coordination, kinesthetic sense, posture, motor skill, proprioception and motor activation to allow for proper function of core, proximal hip and LE with self care and ADLs  [x] (91728) Gait Re-education- Provided training and instruction to the patient for proper LE, core and proximal hip recruitment and positioning and eccentric body weight control with ambulation re-education including up and down stairs     Home Exercise Program:    [x] (65586) Reviewed/Progressed HEP activities related to strengthening, flexibility, endurance, ROM of core, proximal hip and LE for functional self-care, mobility, lifting and ambulation/stair navigation   [] (51119)Reviewed/Progressed HEP activities related to improving balance, coordination, kinesthetic sense, posture, motor skill, proprioception of core, proximal hip and LE for self care, mobility, lifting, and ambulation/stair navigation      Manual Treatments:  PROM / STM / Oscillations-Mobs:  G-I, II, III, IV (PA's, Inf., Post.)  [x] (66204) Provided manual therapy to mobilize LE, proximal hip and/or LS spine soft tissue/joints for the purpose of modulating pain, promoting relaxation,  increasing ROM, reducing/eliminating soft tissue swelling/inflammation/restriction, improving soft tissue extensibility and allowing for proper ROM for normal function with self care, mobility, lifting and ambulation. Modalities:      Charges:   Timed Code Treatment Minutes: 39'   Total Treatment Minutes: 61'     [] EVAL (LOW) 455 1011   [] EVAL (MOD) 12200   [] EVAL (HIGH) 47963   [] RE-EVAL   [x] GI(49429) x 1    [] IONTO  [x] NMR (06839) x  1   [] VASO  [x] Manual (41087) x 1     [] Other:  [] TA x      [] Mech Traction (12709)  [] ES(attended) (87407)      [] ES (un) (49828):       GOALS:   Patient stated goal: to be able to sleep better. [x] Progressing: [] Met: [] Not Met: [] Adjusted    Therapist goals for Patient:   Short Term Goals: To be achieved in: 2 weeks  1. Independent in HEP and progression per patient tolerance, in order to prevent re-injury. [] Progressing: [x] Met: [] Not Met: [] Adjusted   2. Patient will report pain at worst less than or equal to 6/10  [] Progressing: [x] Met: [] Not Met: [] Adjusted    Long Term Goals: To be achieved in: 8 weeks  1. Disability index score of 7% or less for the CHRIS to assist with reaching prior level of function. [x] Progressing: [] Met: [] Not Met: [] Adjusted  2. Patient will demonstrate increased AROM to -FABERs to allow for proper joint functioning as indicated by patients functional deficits. [x] Progressing: [] Met: [] Not Met: [] Adjusted  3. Patient will demonstrate an increase in Strength to in bilateral glut med to 4 to allow for proper functional mobility as indicated by patients Functional Deficits. [x] Progressing: [] Met: [] Not Met: [] Adjusted  4.  Patient will return to sleeping t/o the night without increased Hold pending MD visit [] Discharge    Electronically signed by: Sourav Light DPT [id number]

## 2021-03-08 ENCOUNTER — NURSE ONLY (OUTPATIENT)
Dept: PRIMARY CARE CLINIC | Age: 66
End: 2021-03-08
Payer: COMMERCIAL

## 2021-03-08 DIAGNOSIS — Z23 HIGH PRIORITY FOR COVID-19 VIRUS VACCINATION: Primary | ICD-10-CM

## 2021-03-08 PROCEDURE — 91301 COVID-19, MODERNA VACCINE 100MCG/0.5ML DOSE: CPT

## 2021-03-08 PROCEDURE — 0011A COVID-19, MODERNA VACCINE 100MCG/0.5ML DOSE: CPT

## 2021-03-11 ENCOUNTER — NURSE ONLY (OUTPATIENT)
Dept: PRIMARY CARE CLINIC | Age: 66
End: 2021-03-11

## 2021-04-05 PROCEDURE — 0012A COVID-19, MODERNA VACCINE 100MCG/0.5ML DOSE: CPT | Performed by: NURSE PRACTITIONER

## 2021-04-05 PROCEDURE — 91301 COVID-19, MODERNA VACCINE 100MCG/0.5ML DOSE: CPT | Performed by: NURSE PRACTITIONER

## 2021-04-08 ENCOUNTER — NURSE ONLY (OUTPATIENT)
Dept: PRIMARY CARE CLINIC | Age: 66
End: 2021-04-08
Payer: COMMERCIAL

## 2021-04-08 DIAGNOSIS — Z23 HIGH PRIORITY FOR COVID-19 VIRUS VACCINATION: Primary | ICD-10-CM

## 2024-01-16 NOTE — PRE-PROCEDURE INSTRUCTIONS
[address] [facility] [address]  [phone]        Pre-Op Phone Call:     Patient Name: Milagros Mathew     Telephone Information:   Mobile [phone]     Home phone:  [phone]    Surgery Time:   1050     Arrival Time:  0930     Left extended Message:  Yes     Message left with:[id number]     Recent change in health status:  NA      Instructed to bring eye drops, photo identification, and insurance card day of surgery?  Yes     Advised to wear short sleeved button down shirt (no T-shirt underneath):  Yes     Remarks:        Electronically signed by:  Shantel Nance RN at 1/16/2024 9:26 AM

## 2024-01-17 ENCOUNTER — PREP FOR PROCEDURE (OUTPATIENT)
Dept: OPHTHALMOLOGY | Age: 69
End: 2024-01-17

## 2024-01-17 RX ORDER — PHENYLEPHRINE HYDROCHLORIDE 25 MG/ML
1 SOLUTION/ DROPS OPHTHALMIC ONCE
Status: CANCELLED | OUTPATIENT
Start: 2024-01-17 | End: 2024-01-17

## 2024-01-17 RX ORDER — PROPARACAINE HYDROCHLORIDE 5 MG/ML
1 SOLUTION/ DROPS OPHTHALMIC ONCE
Status: CANCELLED | OUTPATIENT
Start: 2024-01-17 | End: 2024-01-17

## 2024-01-17 RX ORDER — TROPICAMIDE 10 MG/ML
1 SOLUTION/ DROPS OPHTHALMIC ONCE
Status: CANCELLED | OUTPATIENT
Start: 2024-01-17 | End: 2024-01-17

## 2024-01-18 ENCOUNTER — HOSPITAL ENCOUNTER (OUTPATIENT)
Dept: SURGERY | Age: 69
Discharge: HOME | End: 2024-01-18
Payer: MEDICARE

## 2024-01-18 VITALS — DIASTOLIC BLOOD PRESSURE: 88 MMHG | SYSTOLIC BLOOD PRESSURE: 149 MMHG

## 2024-01-18 PROCEDURE — 2500000003 HC RX 250 WO HCPCS: Performed by: OPHTHALMOLOGY

## 2024-01-18 PROCEDURE — 66821 AFTER CATARACT LASER SURGERY: CPT

## 2024-01-18 PROCEDURE — 6370000000 HC RX 637 (ALT 250 FOR IP): Performed by: OPHTHALMOLOGY

## 2024-01-18 RX ORDER — TROPICAMIDE 10 MG/ML
1 SOLUTION/ DROPS OPHTHALMIC ONCE
Status: COMPLETED | OUTPATIENT
Start: 2024-01-18 | End: 2024-01-18

## 2024-01-18 RX ORDER — PHENYLEPHRINE HYDROCHLORIDE 25 MG/ML
1 SOLUTION/ DROPS OPHTHALMIC ONCE
Status: COMPLETED | OUTPATIENT
Start: 2024-01-18 | End: 2024-01-18

## 2024-01-18 RX ORDER — PROPARACAINE HYDROCHLORIDE 5 MG/ML
1 SOLUTION/ DROPS OPHTHALMIC ONCE
Status: COMPLETED | OUTPATIENT
Start: 2024-01-18 | End: 2024-01-18

## 2024-01-18 RX ADMIN — PROPARACAINE HYDROCHLORIDE 1 DROP: 5 SOLUTION/ DROPS OPHTHALMIC at 09:40

## 2024-01-18 RX ADMIN — PHENYLEPHRINE HYDROCHLORIDE 1 DROP: 25 SOLUTION/ DROPS OPHTHALMIC at 09:40

## 2024-01-18 RX ADMIN — TROPICAMIDE 1 DROP: 10 SOLUTION/ DROPS OPHTHALMIC at 09:40

## 2024-01-18 NOTE — PROGRESS NOTES
Patient: Milagros Mathew  1955, 68 y.o./female    Ambulatory to laser room.  Left eye marked and numbed by Dr. Goode.  Laser procedure done and tolerated well by patient.  Post  instructions given to Milagros by provider.      Power setting was 1.8        # of shots was 35    Total power was 61.9 mJ    Meagan Candelario RN

## 2024-01-18 NOTE — DISCHARGE INSTRUCTIONS
[facility]   [address] [place], [place] [address]   [phone]         Post-Operative Instructions for Laser Surgery      2018    Patient Name: Milagros Mathew  : 1955    Tylenol is usually sufficient for any discomfort that you may feel.    It is not uncommon for you to experience the sensation of a large “floater” in your vision. If you experience hundreds of thousands of floaters that do not stop, flashing or arcing lights that do not stop, or a curtain or veil of blackness that you cannot see through, that does not go away, please call the office [phone].  Follow up as planned.

## 2024-01-18 NOTE — OP NOTE
Milagros Mathew    OPERATIVE NOTE    Preoperative Diagnosis: Posterior Capsular Opacification the left eye    Postoperative Diagnosis: Posterior Capsular Opacification the left eye    Procedure: YAG posterior capsulotomy the left eye    Surgeon: Wolfgang Goode MD.    Anesthesia: Topical    Complications: none    Specimens: none    Indications for procedure:  The patient is a 68 y.o. year old who is status post cataract extraction with intraocular lens implantation who complained of increasing glare and blurry vision.  Visually significant posterior capsular opacification was noted on examination.  Risks, benefits, and alternatives to surgery were discussed with the patient and the patient elected to proceed with YAG laser posterior capsulotomy of the the left eye.    Details of the procedure:  The patient was brought the laser room.  The patient had topical proparacaine drops instilled.  The patient was positioned at the YAG laser where 35 shots were administered at 1.8 millijoules in a cruciate pattern.  The patient tolerated the procedure well.  The patient will follow up as an outpatient as scheduled.